# Patient Record
Sex: FEMALE | Race: BLACK OR AFRICAN AMERICAN | NOT HISPANIC OR LATINO | URBAN - METROPOLITAN AREA
[De-identification: names, ages, dates, MRNs, and addresses within clinical notes are randomized per-mention and may not be internally consistent; named-entity substitution may affect disease eponyms.]

---

## 2017-04-27 ENCOUNTER — EMERGENCY (EMERGENCY)
Facility: HOSPITAL | Age: 27
LOS: 1 days | Discharge: HOME | End: 2017-04-27
Attending: EMERGENCY MEDICINE
Payer: COMMERCIAL

## 2017-04-27 VITALS
TEMPERATURE: 98 F | HEART RATE: 93 BPM | HEIGHT: 61 IN | OXYGEN SATURATION: 100 % | RESPIRATION RATE: 17 BRPM | SYSTOLIC BLOOD PRESSURE: 118 MMHG | DIASTOLIC BLOOD PRESSURE: 65 MMHG | WEIGHT: 128.09 LBS

## 2017-04-27 DIAGNOSIS — O20.0 THREATENED ABORTION: ICD-10-CM

## 2017-04-27 DIAGNOSIS — Z3A.19 19 WEEKS GESTATION OF PREGNANCY: ICD-10-CM

## 2017-04-27 PROCEDURE — 99284 EMERGENCY DEPT VISIT MOD MDM: CPT

## 2017-04-27 PROCEDURE — 99284 EMERGENCY DEPT VISIT MOD MDM: CPT | Mod: 25

## 2017-04-27 PROCEDURE — 86901 BLOOD TYPING SEROLOGIC RH(D): CPT

## 2017-04-27 PROCEDURE — 86900 BLOOD TYPING SEROLOGIC ABO: CPT

## 2017-04-27 PROCEDURE — 86850 RBC ANTIBODY SCREEN: CPT

## 2017-04-27 NOTE — ED PROVIDER NOTE - NS ED MD SCRIBE ATTENDING SCRIBE SECTIONS
PAST MEDICAL/SURGICAL/SOCIAL HISTORY/HIV/DISPOSITION/HISTORY OF PRESENT ILLNESS/REVIEW OF SYSTEMS/VITAL SIGNS( Pullset)/PHYSICAL EXAM

## 2017-04-27 NOTE — ED PROVIDER NOTE - MEDICAL DECISION MAKING DETAILS
19 weeks pregnant F () presents to the ED with abdominal cramping and vaginal spotting. IUP with fetal heart beat shown on bedside ultrasound. Will obtain basic labs and likely d/c home. 19 weeks pregnant F () presents to the ED with abdominal cramping and vaginal spotting, worried about miscarriage. IUP with fetal heart beat shown on bedside ultrasound at 160. Type and screen shows O+ blood type. No urinary sx so no ua checked.   Plan to dc with anticipatory guidance and outpt follow up in 3 days as arranged.

## 2017-04-27 NOTE — ED PROVIDER NOTE - OBJECTIVE STATEMENT
26 y/o 19 weeks pregnant F () pt with no PMHx presents to the ED c/o abdominal pressure to RLQ x today. Pt also notes vaginal bleeding. Pt reports blood is visible when she wipes. Pt notes recent IUP on US. Pt denies fever, chills, nausea, vomiting, diarrhea or any other complaints at this time. NKDA 28 y/o 19 weeks pregnant F () pt with no PMHx presents to the ED c/o abdominal pressure to RLQ x today. Pt also notes vaginal bleeding. Pt reports blood is visible when she wipes, no need for pads or panty liners. Pt notes recent IUP on US. Pt denies fever, chills, nausea, vomiting, diarrhea or any other complaints at this time. NKDA

## 2023-05-10 NOTE — ED PROVIDER NOTE - NS PRO PASSIVE SMOKE EXP
Is the patient currently in the state of [place]? YES    Visit mode:VIDEO    If the visit is dropped, the patient can be reconnected by: VIDEO VISIT: Text to cell phone: [phone]    Will anyone else be joining the visit? NO      How would you like to obtain your AVS? MyChart    Are changes needed to the allergy or medication list? NO    Reason for visit: Video Visit (Brain Fog )      Shelby Kocher    
No